# Patient Record
Sex: FEMALE | Race: BLACK OR AFRICAN AMERICAN | URBAN - METROPOLITAN AREA
[De-identification: names, ages, dates, MRNs, and addresses within clinical notes are randomized per-mention and may not be internally consistent; named-entity substitution may affect disease eponyms.]

---

## 2023-02-15 ENCOUNTER — HOSPITAL ENCOUNTER (OUTPATIENT)
Dept: INFUSION THERAPY | Age: 78
Discharge: HOME | End: 2023-02-15
Payer: MEDICARE

## 2023-02-15 ENCOUNTER — INITIAL CONSULT (OUTPATIENT)
Dept: ONCOLOGY | Age: 78
End: 2023-02-15
Payer: MEDICARE

## 2023-02-15 VITALS
OXYGEN SATURATION: 98 % | SYSTOLIC BLOOD PRESSURE: 138 MMHG | WEIGHT: 195.6 LBS | TEMPERATURE: 97.8 F | HEART RATE: 56 BPM | BODY MASS INDEX: 36.93 KG/M2 | HEIGHT: 61 IN | DIASTOLIC BLOOD PRESSURE: 65 MMHG

## 2023-02-15 DIAGNOSIS — D75.839 THROMBOCYTHEMIA: Primary | ICD-10-CM

## 2023-02-15 DIAGNOSIS — D47.3 ESSENTIAL THROMBOCYTHEMIA (HCC): ICD-10-CM

## 2023-02-15 DIAGNOSIS — D75.839 THROMBOCYTHEMIA: ICD-10-CM

## 2023-02-15 LAB
BASOPHILS ABSOLUTE: 0 K/CU MM
BASOPHILS RELATIVE PERCENT: 0.6 % (ref 0–1)
DIFFERENTIAL TYPE: ABNORMAL
EOSINOPHILS ABSOLUTE: 0.1 K/CU MM
EOSINOPHILS RELATIVE PERCENT: 1.3 % (ref 0–3)
HCT VFR BLD CALC: 37.1 % (ref 37–47)
HEMOGLOBIN: 11.9 GM/DL (ref 12.5–16)
LACTATE DEHYDROGENASE: 172 IU/L (ref 120–246)
LYMPHOCYTES ABSOLUTE: 2.1 K/CU MM
LYMPHOCYTES RELATIVE PERCENT: 30.3 % (ref 24–44)
MCH RBC QN AUTO: 27.7 PG (ref 27–31)
MCHC RBC AUTO-ENTMCNC: 32.1 % (ref 32–36)
MCV RBC AUTO: 86.3 FL (ref 78–100)
MONOCYTES ABSOLUTE: 0.3 K/CU MM
MONOCYTES RELATIVE PERCENT: 4.6 % (ref 0–4)
PDW BLD-RTO: 14.2 % (ref 11.7–14.9)
PLATELET # BLD: 453 K/CU MM (ref 140–440)
PMV BLD AUTO: 9.5 FL (ref 7.5–11.1)
RBC # BLD: 4.3 M/CU MM (ref 4.2–5.4)
SEGMENTED NEUTROPHILS ABSOLUTE COUNT: 4.4 K/CU MM
SEGMENTED NEUTROPHILS RELATIVE PERCENT: 63.2 % (ref 36–66)
WBC # BLD: 6.9 K/CU MM (ref 4–10.5)

## 2023-02-15 PROCEDURE — 1123F ACP DISCUSS/DSCN MKR DOCD: CPT | Performed by: INTERNAL MEDICINE

## 2023-02-15 PROCEDURE — 99202 OFFICE O/P NEW SF 15 MIN: CPT

## 2023-02-15 PROCEDURE — 36415 COLL VENOUS BLD VENIPUNCTURE: CPT

## 2023-02-15 PROCEDURE — 83615 LACTATE (LD) (LDH) ENZYME: CPT

## 2023-02-15 PROCEDURE — 85025 COMPLETE CBC W/AUTO DIFF WBC: CPT

## 2023-02-15 PROCEDURE — 99204 OFFICE O/P NEW MOD 45 MIN: CPT | Performed by: INTERNAL MEDICINE

## 2023-02-15 RX ORDER — AMLODIPINE BESYLATE 5 MG/1
TABLET ORAL
COMMUNITY
Start: 2022-12-22

## 2023-02-15 RX ORDER — M-VIT,TX,IRON,MINS/CALC/FOLIC 27MG-0.4MG
1 TABLET ORAL EVERY OTHER DAY
COMMUNITY

## 2023-02-15 RX ORDER — PHENOL 1.4 %
AEROSOL, SPRAY (ML) MUCOUS MEMBRANE
COMMUNITY

## 2023-02-15 RX ORDER — ASPIRIN 81 MG/1
81 TABLET, CHEWABLE ORAL DAILY
COMMUNITY

## 2023-02-15 ASSESSMENT — PATIENT HEALTH QUESTIONNAIRE - PHQ9
1. LITTLE INTEREST OR PLEASURE IN DOING THINGS: 0
SUM OF ALL RESPONSES TO PHQ QUESTIONS 1-9: 0
SUM OF ALL RESPONSES TO PHQ9 QUESTIONS 1 & 2: 0
SUM OF ALL RESPONSES TO PHQ QUESTIONS 1-9: 0
SUM OF ALL RESPONSES TO PHQ QUESTIONS 1-9: 0
2. FEELING DOWN, DEPRESSED OR HOPELESS: 0
SUM OF ALL RESPONSES TO PHQ QUESTIONS 1-9: 0

## 2023-02-15 NOTE — PROGRESS NOTES
Patient Name:  Darien Gregory  Patient :  1945  Patient MRN:  [id number]     Primary Oncologist: Adeline Escobar MD  Referring Provider: Duke Perez MD     Date of Service: 2/15/2023      Reason for Consult: Thrombocythemia      Chief Complaint:    Chief Complaint   Patient presents with    New Patient       Encounter Diagnosis   Name Primary? Thrombocythemia Yes        HPI:   2/15/2023  Arrived to clinic today with spouse. Denies SOB or CP. No ABD Pain. No Weight loss. Denies N/V/D. No Bleeding. Denies leg swelling. has been taking ASA 81mg since December as she was found to have elevated Platelet counts. Denies dizziness. 2022 CBC with WBC of 8.9 hemoglobin of 12.4 hematocrit of 37.4 MCV of 83.9 and platelets of 612     CBC with WBC of 9.7 hemoglobin of 12. 5, platelets of 608     platelet counts of 018 ferritin of 148    2022 WBC of 7.4 hemoglobin of 12.4 hematocrit of 37.1 MCV of 83.9 and platelets of 920    Last Mammogram 10/2022 at [facility] was normal per her. Past Medical History:     Hypertension  Hyperchlosteremia                                                             Past Surgery History:    1962 tonsillectomy and adenoids  2020 cholecystectomy and appendectomy  1972 partial hysterectomy  11/15/1989 bladder opening repair  1997 right rotator cuff repair  2004 left rotator cuff repair  3/20/2010 right eye cataract Removal  2010 left eye cataract Removal  3/16/2011 total thyroidectomy  Right Knee Replacement 8-10 yrs ago    Urethra Repair  Lipoma of Left shoulder Excision                                                            Social History:   Lives with  -  62 yrs. Has 2 children. 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 Denies tobacco use.                                                                                                         Family History:    No history of Leukemia or lymphoma or any other blood dyscrasias. No Known Allergies    Current Outpatient Medications on File Prior to Visit   Medication Sig Dispense Refill    amLODIPine (NORVASC) 5 MG tablet       aspirin 81 MG chewable tablet Take 81 mg by mouth daily      Multiple Vitamins-Minerals (THERAPEUTIC MULTIVITAMIN-MINERALS) tablet Take 1 tablet by mouth every other day      Melatonin 10 MG TABS Take by mouth      atorvastatin (LIPITOR) 10 MG tablet Take 10 mg by mouth daily. atenolol (TENORMIN) 50 MG tablet Take 50 mg by mouth 2 times daily. No current facility-administered medications on file prior to visit. Review of Systems:    Constitutional:  No weight loss, No fever, No chills, No night sweats. Energy level fair  Eyes:  No diplopia, No transient or permanent loss of vision, No scotomata. ENT / Mouth:  No epistaxis, No dysphagia, No hoarseness, No oral ulcers, No gingival bleeding. No sore throat, No postnasal drip, No nasal drip, No mouth pain, No sinus pain, No tinnitus, Normal hearing. Cardiovascular:  No chest pain, No palpitations, No syncope, No upper extremity edema, No lower extremity edema, No calf discomfort. Respiratory:  No cough. No hemoptysis, No pleurisy, No wheezing, No dyspnea. Gastrointestinal:  No abdominal pain, No abdominal cramping, No nausea, No vomiting, No constipation, No diarrhea, No hematochezia, No melena, No jaundice, No dyspepsia, No dysphagia. Urinary:  No dysuria, No hematuria, No urinary incontinence  Musculoskeletal:  No muscle pain, No swollen joints, No joint redness, No bone pain, No spine tenderness. Skin:  No rash, No nodules, No pruritus, No lesions. Neurologic:  No confusion, No seizures, No syncope, No tremor, No speech change, No headache, No hiccups, No abnormal gait, No sensory changes, No weakness.      Vital Signs: /65 (Site: Right Lower Arm, Position: Sitting, Cuff Size: Large Adult)   Pulse 56   Temp 97.8 °F (36.6 °C) (Temporal)   Ht 5' 1\" (1.549 m)   Wt 195 lb 9.6 oz (88.7 kg)   SpO2 98%   BMI 36.96 kg/m²      CONSTITUTIONAL: awake, aler  EYES: ADAM, No pallor or any icterus  ENT: ATNC  NECK: No JVD  HEMATOLOGIC/LYMPHATIC: no cervical, supraclavicular or axillary lymphadenopathy   LUNGS: CTAB  CARDIOVASCULAR: s1s2 no murmurs Bradycardia  ABDOMEN: soft ntnd bs pos  MUSCULOSKELETAL: full range of motion noted, tone is normal  NEUROLOGIC: GI  SKIN: No rash  EXTREMITIES: no LE edema bilaterally     Labs:  Hematology:  No results found for: WBC, RBC, HGB, HCT, MCV, MCH, MCHC, RDW, PLT, MPV, BANDSPCT, SEGSPCT, EOSRELPCT, BASOPCT, LYMPHOPCT, MONOPCT, BANDABS, SEGSABS, EOSABS, BASOSABS, LYMPHSABS, MONOSABS, DIFFTYPE, ANISOCYTOSIS, POLYCHROM, WBCMORP, PLTM  No results found for: ESR  Chemistry:  No results found for: NA, K, CL, CO2, BUN, CREATININE, GLUCOSE, CALCIUM, PROT, LABALBU, BILITOT, ALKPHOS, AST, ALT, LABGLOM, GFRAA, AGRATIO, GLOB, PHOS, MG, POCCA, POCGLU  No results found for: MMA, LDH, HOMOCYSTEINE  No components found for: LD  No results found for: TSHHS, T4FREE, FT3  Immunology:  No results found for: PROT, SPEP, ALBUMINELP, LABALPH, LABBETA, GAMGLOB  No results found for: Gulshan Ruse, LAMBDAUVOL, KLFLCR  No results found for: B2M  Coagulation Panel:  No results found for: PROTIME, INR, APTT, DDIMER  Anemia Panel:  No results found for: [id number], FOLATE  Tumor Markers:  No results found for: , CEA, , LABCA2, PSA     Observations:  ECOG:  PHQ-9 Total Score: 0 (2/15/2023 11:31 AM)       Assessment & Plan: Thrombocythemia, seems to be chronic. No recent injury or infection or surgery. Rule out myeloproliferative neoplasm, JAK2 panel, MPL and CALR, LDH ordered. CML PCR also ordered as isolated thrombocythemia is seen in CML. Also rule out iron deficiency.   No signs or symptoms of underlying malignancy hence will not obtain any imaging studies. No compelling need to do any bone marrow biopsy at this point  Note that she has already been started on 81 mg of aspirin, continue for now. No other thromboembolic events in the past.    Bradycardia, asymptomatic at this point. Continue to follow-up with primary care physician. Continue other medical care. Thank you for letting us be part of the care and will follow along. Discussed above findings and plan with her and she voiced understanding. Answered all her questions. Discussed healthy lifestyle including healthy diet, regular exercise as tolerated. Also discussed importance of being up-to-date with age-appropriate screening tools. Discussed Cologuard, colonoscopy. Mammogram October 2022 was normal per her, continue yearly mammogram for screening. Recommend follow-up with primary care physician and other specialists. Please do not hesitate to contact us if you need further information.     Return to clinic March 2023 or earlier if new Sx      TOMASA

## 2023-02-15 NOTE — PROGRESS NOTES
MA Rooming Questions  Patient: Doris Glez  MRN: [id number]    Date: 2/15/2023        NEW PATIENT     5. Did the patient have a depression screening completed today?  Yes    PHQ-9 Total Score: 0 (2/15/2023 11:31 AM)       PHQ-9 Given to (if applicable):               PHQ-9 Score (if applicable):                     [] Positive     []  Negative              Does question #9 need addressed (if applicable)                     [] Yes    []  No               Dillan Kinsey CMA

## 2023-03-09 NOTE — PROGRESS NOTES
Patient Name:  Alyssa Aguilar  Patient :  1945  Patient MRN:  [id number]     Primary Oncologist: Ceci Hedrick MD  Referring Provider: Toya Sanderson MD     Date of Service: 3/10/2023      Reason for Consult: Thrombocythemia      Chief Complaint:    Chief Complaint   Patient presents with    Follow-up    Results         Encounter Diagnosis   Name Primary? Thrombocythemia Yes          HPI:   2/15/2023  Arrived to clinic today with spouse. Denies SOB or CP. No ABD Pain. No Weight loss. Denies N/V/D. No Bleeding. Denies leg swelling. has been taking ASA 81mg since December as she was found to have elevated Platelet counts. Denies dizziness.     2022 CBC with WBC of 8.9 hemoglobin of 12.4 hematocrit of 37.4 MCV of 83.9 and platelets of 351    6413 CBC with WBC of 9.7 hemoglobin of 12. 5, platelets of 498     platelet counts of 413 ferritin of 148    2022 WBC of 7.4 hemoglobin of 12.4 hematocrit of 37.1 MCV of 83.9 and platelets of 288    Last Mammogram 10/2022 at [facility] was normal per her.    2/15/23 CBC WBC 6.9 hemoglobin 11.9 hematocrit 37.1 MCV 86.3 platelets 754 ANC 0028      22 [employer]  BCR-ABL 1 translocation t(9;22) not detected    22 [employer]  JAK2 V617F mutation not detected  JAK2 EXONn 12-13 mutation not detected  Calreticulin mutation not detected  MPL mutation not detected      Past Medical History:     Hypertension  Hyperchlosteremia                                                             Past Surgery History:    1962 tonsillectomy and adenoids  2020 cholecystectomy and appendectomy  1972 partial hysterectomy  11/15/1989 bladder opening repair  1997 right rotator cuff repair  2004 left rotator cuff repair  3/20/2010 right eye cataract Removal  2010 left eye cataract Removal  3/16/2011 total thyroidectomy  Right Knee Replacement 8-10 yrs ago    Urethra Repair  Lipoma of Left shoulder Excision Social History:   Lives with  -  62 yrs. Has 2 children. 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 Family History:    No history of Leukemia or lymphoma or any other blood dyscrasias. No Known Allergies    Current Outpatient Medications on File Prior to Visit   Medication Sig Dispense Refill    amLODIPine (NORVASC) 5 MG tablet       aspirin 81 MG chewable tablet Take 81 mg by mouth daily      Multiple Vitamins-Minerals (THERAPEUTIC MULTIVITAMIN-MINERALS) tablet Take 1 tablet by mouth every other day      Melatonin 10 MG TABS Take by mouth      atorvastatin (LIPITOR) 10 MG tablet Take 10 mg by mouth daily. atenolol (TENORMIN) 50 MG tablet Take 50 mg by mouth 2 times daily. No current facility-administered medications on file prior to visit. Interval HPI 3/10/23: Arrived to the clinic today with spouse. Is in great spirits. No CP. No SOB. No ABD Pain. No Bleeding.      Review of Systems:    As per interval HPI    Vital Signs: BP (!) 141/65 (Site: Left Upper Arm, Position: Sitting, Cuff Size: Large Adult)   Pulse 58   Temp 97.9 °F (36.6 °C) (Temporal)   Ht 5' 1\" (1.549 m)   Wt 194 lb (88 kg)   SpO2 99%   BMI 36.66 kg/m²      CONSTITUTIONAL: awake, aler  EYES: ADAM, No pallor or any icterus  ENT: ATNC  NECK: No JVD  HEMATOLOGIC/LYMPHATIC: no cervical, supraclavicular or axillary lymphadenopathy   LUNGS: CTAB  CARDIOVASCULAR: s1s2 no murmurs Bradycardia  ABDOMEN: soft ntnd bs pos  MUSCULOSKELETAL: full range of motion noted, tone is normal  NEUROLOGIC: GI  SKIN: No rash  EXTREMITIES: no LE edema bilaterally     Labs:  Hematology:  Lab Results   Component Value Date    WBC 6.9 02/15/2023    RBC 4.30 02/15/2023    HGB 11.9 (L) 02/15/2023    HCT 37.1 02/15/2023    MCV 86.3 02/15/2023    MCH 27.7 02/15/2023    MCHC 32.1 02/15/2023    RDW 14.2 02/15/2023     (H) 02/15/2023    MPV 9.5 02/15/2023    SEGSPCT 63.2 02/15/2023    EOSRELPCT 1.3 02/15/2023    BASOPCT 0.6 02/15/2023    LYMPHOPCT 30.3 02/15/2023    MONOPCT 4.6 (H) 02/15/2023    SEGSABS 4.4 02/15/2023    EOSABS 0.1 02/15/2023    BASOSABS 0.0 02/15/2023    LYMPHSABS 2.1 02/15/2023    MONOSABS 0.3 02/15/2023    DIFFTYPE AUTOMATED DIFFERENTIAL 02/15/2023     No results found for: ESR  Chemistry:  No results found for: NA, K, CL, CO2, BUN, CREATININE, GLUCOSE, CALCIUM, PROT, LABALBU, BILITOT, ALKPHOS, AST, ALT, LABGLOM, GFRAA, AGRATIO, GLOB, PHOS, MG, POCCA, POCGLU  Lab Results   Component Value Date     02/15/2023     No components found for: LD  No results found for: TSHHS, T4FREE, FT3  Immunology:  No results found for: PROT, SPEP, ALBUMINELP, LABALPH, LABBETA, GAMGLOB  No results found for: Shahriar Skelton, KLFLCR  No results found for: B2M  Coagulation Panel:  No results found for: PROTIME, INR, APTT, DDIMER  Anemia Panel:  No results found for: [id number], FOLATE  Tumor Markers:  No results found for: , CEA, , LABCA2, PSA     Observations:  ECOG:  PHQ-9 Total Score: 0 (3/10/2023 11:21 AM)       Assessment & Plan: Thrombocythemia, seems to be chronic. No recent injury or infection or surgery. No S/Sx on underlying malignancy. No evidence of myeloproliferative neoplasm, JAK2 panel, MPL and CALR, LDH Normal.   CML PCR also ordered which was normal, as isolated thrombocythemia is seen in CML. No compelling need to do any bone marrow biopsy at this point  Note that she has already been started on 81 mg of aspirin, continue for now 2-3 times a week. No other thromboembolic events in the past.    Bradycardia, asymptomatic at this point.   Continue to follow-up with primary care physician. Continue other medical care. Thank you for letting us be part of the care and will follow along. Discussed above findings and plan with her and she voiced understanding. Answered all her questions. Discussed healthy lifestyle including healthy diet, regular exercise as tolerated. Also discussed importance of being up-to-date with age-appropriate screening tools. Discussed Cologuard, colonoscopy. Mammogram October 2022 was normal per her, continue yearly mammogram for screening. Recommend follow-up with primary care physician and other specialists. Please do not hesitate to contact us if you need further information. Return to clinic Sept 2023 or earlier if new Sx    Scribe Authentication Statement  Nichole Bran scribed portions of this documentation for and in the presence of Ceci Hedrick MD 3/10/23 11:29 AM EST     Provider Jeff Hyatt M.D., personally performed the services described in this documentation and they were scribed in my presence by Nichole Bran MA. The documentation is both accurate and complete. Ceci Hedrick MD     This note is created with the assistance of a speech-recognition program. While intending to generate a document that accurately reflects the content of the visit, no guarantee can be provided that every mistake has been identified and corrected by editing.     [id number]

## 2023-03-10 ENCOUNTER — OFFICE VISIT (OUTPATIENT)
Dept: ONCOLOGY | Age: 78
End: 2023-03-10

## 2023-03-10 ENCOUNTER — HOSPITAL ENCOUNTER (OUTPATIENT)
Dept: INFUSION THERAPY | Age: 78
Discharge: HOME | End: 2023-03-10
Payer: MEDICARE

## 2023-03-10 VITALS
OXYGEN SATURATION: 99 % | TEMPERATURE: 97.9 F | DIASTOLIC BLOOD PRESSURE: 65 MMHG | HEART RATE: 58 BPM | BODY MASS INDEX: 36.63 KG/M2 | WEIGHT: 194 LBS | HEIGHT: 61 IN | SYSTOLIC BLOOD PRESSURE: 141 MMHG

## 2023-03-10 DIAGNOSIS — D64.9 ANEMIA, UNSPECIFIED TYPE: ICD-10-CM

## 2023-03-10 DIAGNOSIS — D75.839 THROMBOCYTHEMIA: Primary | ICD-10-CM

## 2023-03-10 PROCEDURE — 99211 OFF/OP EST MAY X REQ PHY/QHP: CPT

## 2023-03-10 ASSESSMENT — PATIENT HEALTH QUESTIONNAIRE - PHQ9
SUM OF ALL RESPONSES TO PHQ QUESTIONS 1-9: 0
SUM OF ALL RESPONSES TO PHQ9 QUESTIONS 1 & 2: 0
2. FEELING DOWN, DEPRESSED OR HOPELESS: 0
SUM OF ALL RESPONSES TO PHQ QUESTIONS 1-9: 0
1. LITTLE INTEREST OR PLEASURE IN DOING THINGS: 0
SUM OF ALL RESPONSES TO PHQ QUESTIONS 1-9: 0
SUM OF ALL RESPONSES TO PHQ QUESTIONS 1-9: 0

## 2023-03-10 NOTE — PROGRESS NOTES
MA Rooming Questions  Patient: Michele Guzmanms  MRN: [id number]    Date: 3/10/2023        1. Do you have any new issues?   no         2. Do you need any refills on medications?    no    3. Have you had any imaging done since your last visit? yes - labs 2/15    4. Have you been hospitalized or seen in the emergency room since your last visit here?   no    5. Did the patient have a depression screening completed today?  Yes    PHQ-9 Total Score: 0 (3/10/2023 11:21 AM)       PHQ-9 Given to (if applicable):               PHQ-9 Score (if applicable):                     [] Positive     []  Negative              Does question #9 need addressed (if applicable)                     [] Yes    []  No               Rachelle Gore CMA

## 2023-03-21 LAB
BCR/ABL T(9,22): NORMAL
CALR MUTATION: NORMAL
JAK2 EXONS 12-15 MUTATION: NORMAL
JAK2 P.V617F BLD/T QL: NORMAL
MPL 515 MUTATION: NORMAL

## 2023-09-08 ENCOUNTER — HOSPITAL ENCOUNTER (OUTPATIENT)
Dept: INFUSION THERAPY | Age: 78
Discharge: HOME | End: 2023-09-08
Payer: MEDICARE

## 2023-09-08 DIAGNOSIS — D75.839 THROMBOCYTHEMIA: ICD-10-CM

## 2023-09-08 DIAGNOSIS — D64.9 ANEMIA, UNSPECIFIED TYPE: ICD-10-CM

## 2023-09-08 LAB
ALBUMIN SERPL-MCNC: 4.6 GM/DL (ref 3.4–5)
ALP BLD-CCNC: 128 IU/L (ref 40–129)
ALT SERPL-CCNC: 9 U/L (ref 10–40)
ANION GAP SERPL CALCULATED.3IONS-SCNC: 13 MMOL/L (ref 4–16)
AST SERPL-CCNC: 17 IU/L (ref 15–37)
BASOPHILS ABSOLUTE: 0 K/CU MM
BASOPHILS RELATIVE PERCENT: 0.5 % (ref 0–1)
BILIRUB SERPL-MCNC: 0.4 MG/DL (ref 0–1)
BUN SERPL-MCNC: 13 MG/DL (ref 6–23)
CALCIUM SERPL-MCNC: 9.9 MG/DL (ref 8.3–10.6)
CHLORIDE BLD-SCNC: 104 MMOL/L (ref 99–110)
CO2: 23 MMOL/L (ref 21–32)
CREAT SERPL-MCNC: 1.1 MG/DL (ref 0.6–1.1)
DIFFERENTIAL TYPE: ABNORMAL
EOSINOPHILS ABSOLUTE: 0.1 K/CU MM
EOSINOPHILS RELATIVE PERCENT: 2.3 % (ref 0–3)
FERRITIN: 75 NG/ML (ref 15–150)
FOLATE SERPL-MCNC: >20 NG/ML (ref 3.1–17.5)
GFR SERPL CREATININE-BSD FRML MDRD: 52 ML/MIN/1.73M2
GLUCOSE SERPL-MCNC: 97 MG/DL (ref 70–99)
HCT VFR BLD CALC: 38.6 % (ref 37–47)
HEMOGLOBIN: 12 GM/DL (ref 12.5–16)
IRON: 53 UG/DL (ref 37–145)
LACTATE DEHYDROGENASE: 177 IU/L (ref 120–246)
LYMPHOCYTES ABSOLUTE: 2 K/CU MM
LYMPHOCYTES RELATIVE PERCENT: 33.2 % (ref 24–44)
MCH RBC QN AUTO: 27.3 PG (ref 27–31)
MCHC RBC AUTO-ENTMCNC: 31.1 % (ref 32–36)
MCV RBC AUTO: 87.7 FL (ref 78–100)
MONOCYTES ABSOLUTE: 0.3 K/CU MM
MONOCYTES RELATIVE PERCENT: 5.5 % (ref 0–4)
PCT TRANSFERRIN: 20 % (ref 10–44)
PDW BLD-RTO: 14.4 % (ref 11.7–14.9)
PLATELET # BLD: 162 K/CU MM (ref 140–440)
PMV BLD AUTO: 10.7 FL (ref 7.5–11.1)
POTASSIUM SERPL-SCNC: 4 MMOL/L (ref 3.5–5.1)
RBC # BLD: 4.4 M/CU MM (ref 4.2–5.4)
SEGMENTED NEUTROPHILS ABSOLUTE COUNT: 3.5 K/CU MM
SEGMENTED NEUTROPHILS RELATIVE PERCENT: 58.5 % (ref 36–66)
SODIUM BLD-SCNC: 140 MMOL/L (ref 135–145)
TOTAL IRON BINDING CAPACITY: 265 UG/DL (ref 250–450)
TOTAL PROTEIN: 6.7 GM/DL (ref 6.4–8.2)
UNSATURATED IRON BINDING CAPACITY: 212 UG/DL (ref 110–370)
VITAMIN B-12: 640.5 PG/ML (ref 211–911)
WBC # BLD: 6 K/CU MM (ref 4–10.5)

## 2023-09-08 PROCEDURE — 82607 VITAMIN B-12: CPT

## 2023-09-08 PROCEDURE — 83550 IRON BINDING TEST: CPT

## 2023-09-08 PROCEDURE — 82746 ASSAY OF FOLIC ACID SERUM: CPT

## 2023-09-08 PROCEDURE — 85025 COMPLETE CBC W/AUTO DIFF WBC: CPT

## 2023-09-08 PROCEDURE — 82728 ASSAY OF FERRITIN: CPT

## 2023-09-08 PROCEDURE — 83540 ASSAY OF IRON: CPT

## 2023-09-08 PROCEDURE — 36415 COLL VENOUS BLD VENIPUNCTURE: CPT

## 2023-09-08 PROCEDURE — 83615 LACTATE (LD) (LDH) ENZYME: CPT

## 2023-09-08 PROCEDURE — 80053 COMPREHEN METABOLIC PANEL: CPT

## 2023-09-15 ENCOUNTER — HOSPITAL ENCOUNTER (OUTPATIENT)
Dept: INFUSION THERAPY | Age: 78
Discharge: HOME | End: 2023-09-15
Payer: MEDICARE

## 2023-09-15 ENCOUNTER — OFFICE VISIT (OUTPATIENT)
Dept: ONCOLOGY | Age: 78
End: 2023-09-15
Payer: MEDICARE

## 2023-09-15 VITALS
BODY MASS INDEX: 35.87 KG/M2 | DIASTOLIC BLOOD PRESSURE: 60 MMHG | OXYGEN SATURATION: 97 % | TEMPERATURE: 97.2 F | HEIGHT: 61 IN | SYSTOLIC BLOOD PRESSURE: 125 MMHG | WEIGHT: 190 LBS | HEART RATE: 55 BPM

## 2023-09-15 DIAGNOSIS — D64.9 ANEMIA, UNSPECIFIED TYPE: ICD-10-CM

## 2023-09-15 DIAGNOSIS — D47.3 ESSENTIAL THROMBOCYTHEMIA (HCC): ICD-10-CM

## 2023-09-15 DIAGNOSIS — D75.839 THROMBOCYTHEMIA: Primary | ICD-10-CM

## 2023-09-15 PROCEDURE — 99211 OFF/OP EST MAY X REQ PHY/QHP: CPT

## 2023-09-15 PROCEDURE — 1123F ACP DISCUSS/DSCN MKR DOCD: CPT | Performed by: INTERNAL MEDICINE

## 2023-09-15 PROCEDURE — 99213 OFFICE O/P EST LOW 20 MIN: CPT | Performed by: INTERNAL MEDICINE

## 2023-09-15 RX ORDER — MONTELUKAST SODIUM 10 MG/1
TABLET ORAL
COMMUNITY
Start: 2023-09-01

## 2023-09-15 RX ORDER — FUROSEMIDE 20 MG/1
TABLET ORAL
COMMUNITY
Start: 2023-09-01

## 2023-09-15 ASSESSMENT — PATIENT HEALTH QUESTIONNAIRE - PHQ9
SUM OF ALL RESPONSES TO PHQ QUESTIONS 1-9: 0
SUM OF ALL RESPONSES TO PHQ QUESTIONS 1-9: 0
1. LITTLE INTEREST OR PLEASURE IN DOING THINGS: 0
2. FEELING DOWN, DEPRESSED OR HOPELESS: 0
SUM OF ALL RESPONSES TO PHQ QUESTIONS 1-9: 0
SUM OF ALL RESPONSES TO PHQ QUESTIONS 1-9: 0
SUM OF ALL RESPONSES TO PHQ9 QUESTIONS 1 & 2: 0

## 2023-09-15 NOTE — PROGRESS NOTES
MA Rooming Questions  Patient: Shweta Go  MRN: [id number]    Date: 9/15/2023        1. Do you have any new issues?   no         2. Do you need any refills on medications?    no    3. Have you had any imaging done since your last visit? yes - labs 9/8    4. Have you been hospitalized or seen in the emergency room since your last visit here?   no    5. Did the patient have a depression screening completed today?  Yes    PHQ-9 Total Score: 0 (9/15/2023 10:46 AM)       PHQ-9 Given to (if applicable):               PHQ-9 Score (if applicable):                     [] Positive     []  Negative              Does question #9 need addressed (if applicable)                     [] Yes    []  No               Alfredo Lopez CMA
pos  NEUROLOGIC: GI  SKIN: No rash  EXTREMITIES: no LE edema bilaterally     Labs:  Hematology:  Lab Results   Component Value Date    WBC 6.0 09/08/2023    RBC 4.40 09/08/2023    HGB 12.0 (L) 09/08/2023    HCT 38.6 09/08/2023    MCV 87.7 09/08/2023    MCH 27.3 09/08/2023    MCHC 31.1 (L) 09/08/2023    RDW 14.4 09/08/2023     09/08/2023    MPV 10.7 09/08/2023    SEGSPCT 58.5 09/08/2023    EOSRELPCT 2.3 09/08/2023    BASOPCT 0.5 09/08/2023    LYMPHOPCT 33.2 09/08/2023    MONOPCT 5.5 (H) 09/08/2023    SEGSABS 3.5 09/08/2023    EOSABS 0.1 09/08/2023    BASOSABS 0.0 09/08/2023    LYMPHSABS 2.0 09/08/2023    MONOSABS 0.3 09/08/2023    DIFFTYPE AUTOMATED DIFFERENTIAL 09/08/2023     No results found for: \"ESR\"  Chemistry:  Lab Results   Component Value Date     09/08/2023    K 4.0 09/08/2023     09/08/2023    CO2 23 09/08/2023    BUN 13 09/08/2023    CREATININE 1.1 09/08/2023    GLUCOSE 97 09/08/2023    CALCIUM 9.9 09/08/2023    PROT 6.7 09/08/2023    LABALBU 4.6 09/08/2023    BILITOT 0.4 09/08/2023    ALKPHOS 128 09/08/2023    AST 17 09/08/2023    ALT 9 (L) 09/08/2023    LABGLOM 52 (L) 09/08/2023     Lab Results   Component Value Date     09/08/2023     No results found for: \"LD\"  No results found for: \"TSHHS\", \"T4FREE\", \"FT3\"  Immunology:  Lab Results   Component Value Date    PROT 6.7 09/08/2023     No results found for: \"KAPPAUVOL\", \"LAMBDAUVOL\", \"KLFLCR\"  No results found for: \"B2M\"  Coagulation Panel:  No results found for: \"PROTIME\", \"INR\", \"APTT\", \"DDIMER\"  Anemia Panel:  Lab Results   Component Value Date    [id number] 640.5 09/08/2023    FOLATE >20.0 (H) 09/08/2023     Tumor Markers:  No results found for: \"\", \"CEA\", \"\", \"LABCA2\", \"PSA\"     Observations:  ECOG:  PHQ-9 Total Score: 0 (9/15/2023 10:46 AM)         Assessment & Plan: Thrombocythemia, seems to be chronic. No injury or infection or surgery.  No S/Sx on